# Patient Record
(demographics unavailable — no encounter records)

---

## 2025-03-05 NOTE — RESULTS/DATA
[TextEntry] : Chest x-ray performed 11/30/2023 images and radiologist read reviewed, by my read demonstrating no acute intrathoracic pathologies.

## 2025-03-05 NOTE — HISTORY OF PRESENT ILLNESS
[Never] : never [TextBox_4] : Mr. AMAYA is a 74 year man with a medical history significant for hypertension, CKD stage III, chronic anemia presenting today to the clinic for cough & wheezing.   COVID in Dec 2023, since then cough has worsened. Got better in February, then came back again in March Associated with wheezing, nonproductive Got a dog in January  # Cough 	Duration?:	~4 months 	Illness?:		COVID at the time 	Predisposing?:	~  ~ 	ACE-i?:		no 	Treatments?:	previously on robitussin, Abx, Prednisone (improved but came back) 	Current regimen?:	nothing  Chronic cough workup: 	Asthma workup?	never, cough from chest, a/w wheeze, nonpleuritic 	GERD workup?	denies 	UACS workup?	rarely allergy symptoms, denies recurrence  Occupational Hx: retired [employer], no exposures no drugs   Interval history:  At our last visit, patient had complete resolution with intermittent symbicort.

## 2025-03-18 NOTE — ADDENDUM
[FreeTextEntry1] :  MARQUIS SORIANO, am scribing for and in the presence of  in the following sections: HISTORY OF PRESENT ILLNESS, PAST MEDICAL/FAMILY/SOCIAL HISTORY, REVIEW OF SYSTEMS, VITAL SIGNS, PHYSICAL EXAM, ASSESSMENT/PLAN on 03/11/2025.

## 2025-03-18 NOTE — ASSESSMENT
[FreeTextEntry1] :  74 year old male patient with cauda equina syndrome and neurogenic bladder. We discussed the Tamsulosin and I told them they can stop it and see and if there is worsening of symptoms, he can re-start the medication.  All of the patient's questions were otherwise answered. We cecy a PSA level today and he will follow-up in 6 months for urodynamics study. Pt seen with NANCY Cotto. Total time=30 min.   The submitted E/M billing level for this visit reflects the total time spent on the day of the visit including face-to-face time spent with the patient, non-face-to-face review of medical records and relevant information, documentation, and asynchronous communication with the patient after a visit via phone, email, or patients EHR portal after the visit. The medical records reviewed are either scanned into the chart or reviewed with the patient using a patients electronic medical records portal for patients with records not available to [facility] via electronic transmission platforms from other institutions and labs. Time spent counseling and performing coordination of care was also included in determining the appropriate EM billing level.   I have reviewed and verified information regarding the chief complaint and history recorded by the ancillary staff and/or the patient. I have independently reviewed and interpreted tests performed by other physicians and facilities as necessary.   I have discussed with the patient differential diagnosis, reason for auxiliary tests if ordered, risks, benefits, alternatives, and complications of each form of therapy were discussed.  I personally performed the services described in the documentation, reviewed the documentation recorded by the scribe in my presence, and it accurately and completely records my words and actions.

## 2025-03-18 NOTE — ASSESSMENT
[FreeTextEntry1] :  74 year old male patient with cauda equina syndrome and neurogenic bladder. We discussed the Tamsulosin and I told them they can stop it and see and if there is worsening of symptoms, he can re-start the medication.  All of the patient's questions were otherwise answered. We cecy a PSA level today and he will follow-up in 6 months for urodynamics study. Pt seen with NANCY Cotto. Total time=30 min.   The submitted E/M billing level for this visit reflects the total time spent on the day of the visit including face-to-face time spent with the patient, non-face-to-face review of medical records and relevant information, documentation, and asynchronous communication with the patient after a visit via phone, email, or patients EHR portal after the visit. The medical records reviewed are either scanned into the chart or reviewed with the patient using a patients electronic medical records portal for patients with records not available to [facility] via electronic transmission platforms from other institutions and labs. Time spent counseling and performing coordination of care was also included in determining the appropriate EM billing level.   I have reviewed and verified information regarding the chief complaint and history recorded by the ancillary staff and/or the patient. I have independently reviewed and interpreted tests performed by other physicians and facilities as necessary.   I have discussed with the patient differential diagnosis, reason for auxiliary tests if ordered, risks, benefits, alternatives, and complications of each form of therapy were discussed.  I personally performed the services described in the documentation, reviewed the documentation recorded by the scribe in my presence, and it accurately and completely records my words and actions.

## 2025-03-18 NOTE — HISTORY OF PRESENT ILLNESS
[FreeTextEntry1] :  74 year old male patient seen in follow-up with cauda equina syndrome and neurogenic bladder. He is doing CIC 4x/day without issues. Pt is doing good. We reviewed his ultrasound, which looked good - showing some stable thickened bladder wall.  Pt and wife would like to know if they can discontinue the Tamsulosin and we discussed this.   PSA from 10/2023: 1.1  Renal and Bladder Ultrasound from 2/24/25:  The right kidney is hypertrophied, otherwise unremarkable, stable. The left kidney demonstrates mild caliectasis, improved since prior exam.  2. Diffusely thickened bladder wall, stable. No significant postvoid residual, improved. The prostate gland is not enlarged.